# Patient Record
Sex: MALE | Race: WHITE
[De-identification: names, ages, dates, MRNs, and addresses within clinical notes are randomized per-mention and may not be internally consistent; named-entity substitution may affect disease eponyms.]

---

## 2023-08-22 ENCOUNTER — HOSPITAL ENCOUNTER (EMERGENCY)
Dept: HOSPITAL 97 - ER | Age: 63
Discharge: HOME | End: 2023-08-22
Payer: COMMERCIAL

## 2023-08-22 VITALS — TEMPERATURE: 97.2 F

## 2023-08-22 VITALS — DIASTOLIC BLOOD PRESSURE: 65 MMHG | SYSTOLIC BLOOD PRESSURE: 100 MMHG

## 2023-08-22 VITALS — OXYGEN SATURATION: 100 %

## 2023-08-22 DIAGNOSIS — Z79.82: ICD-10-CM

## 2023-08-22 DIAGNOSIS — K92.2: ICD-10-CM

## 2023-08-22 DIAGNOSIS — E11.9: ICD-10-CM

## 2023-08-22 DIAGNOSIS — K59.00: Primary | ICD-10-CM

## 2023-08-22 DIAGNOSIS — I10: ICD-10-CM

## 2023-08-22 DIAGNOSIS — Z95.818: ICD-10-CM

## 2023-08-22 DIAGNOSIS — Z79.4: ICD-10-CM

## 2023-08-22 DIAGNOSIS — K51.511: ICD-10-CM

## 2023-08-22 DIAGNOSIS — I25.2: ICD-10-CM

## 2023-08-22 LAB
ALBUMIN SERPL BCP-MCNC: 3.5 G/DL (ref 3.4–5)
ALP SERPL-CCNC: 62 U/L (ref 45–117)
ALT SERPL W P-5'-P-CCNC: 33 U/L (ref 16–61)
AST SERPL W P-5'-P-CCNC: 18 U/L (ref 15–37)
BUN BLD-MCNC: 18 MG/DL (ref 7–18)
GLUCOSE SERPLBLD-MCNC: 92 MG/DL (ref 74–106)
HCT VFR BLD CALC: 41 % (ref 39.6–49)
LIPASE SERPL-CCNC: 22 U/L (ref 13–75)
LYMPHOCYTES # SPEC AUTO: 1.2 K/UL (ref 0.7–4.9)
MCV RBC: 89.7 FL (ref 80–100)
PMV BLD: 7.6 FL (ref 7.6–11.3)
POTASSIUM SERPL-SCNC: 3.9 MEQ/L (ref 3.5–5.1)
RBC # BLD: 4.57 M/UL (ref 4.33–5.43)
WBC # BLD AUTO: 5.5 THOU/UL (ref 4.3–10.9)

## 2023-08-22 PROCEDURE — 96361 HYDRATE IV INFUSION ADD-ON: CPT

## 2023-08-22 PROCEDURE — 80053 COMPREHEN METABOLIC PANEL: CPT

## 2023-08-22 PROCEDURE — 83690 ASSAY OF LIPASE: CPT

## 2023-08-22 PROCEDURE — 81003 URINALYSIS AUTO W/O SCOPE: CPT

## 2023-08-22 PROCEDURE — 96367 TX/PROPH/DG ADDL SEQ IV INF: CPT

## 2023-08-22 PROCEDURE — 96375 TX/PRO/DX INJ NEW DRUG ADDON: CPT

## 2023-08-22 PROCEDURE — 36415 COLL VENOUS BLD VENIPUNCTURE: CPT

## 2023-08-22 PROCEDURE — 96365 THER/PROPH/DIAG IV INF INIT: CPT

## 2023-08-22 PROCEDURE — 85025 COMPLETE CBC W/AUTO DIFF WBC: CPT

## 2023-08-22 PROCEDURE — 99284 EMERGENCY DEPT VISIT MOD MDM: CPT

## 2023-08-22 PROCEDURE — 74177 CT ABD & PELVIS W/CONTRAST: CPT

## 2023-08-22 NOTE — RAD REPORT
EXAM DESCRIPTION:  CTAbdomen   Pelvis W Contrast - 8/22/2023 11:19 am

 

CLINICAL HISTORY:

Abd pain;Constipation

 

COMPARISON:  No comparisons

 

TECHNIQUE:  CT of the abdomen and pelvis was performed with IV contrast.

 

All CT scans are performed using dose optimization technique as appropriate and may include automated
 exposure control or mA/KV adjustment according to patient size.

 

FINDINGS:  Lower chest: Mild circumferential thickened distal esophagus . Small hiatal hernia. Corona
ry artery calcifications.

Liver: Question hepatic steatosis.

Biliary: No biliary ductal dilatation.

Stomach: No significant focal abnormality.

Duodenum: No significant focal abnormality.

Pancreas: No significant abnormality.

Spleen: No significant abnormality.

Adrenal: No suspicious lesions.

Kidney/ureter: No hydronephrosis. No renal calculi. Bilateral renal sinus and renal cortical cysts.

Retroperitoneum: No retroperitoneal adenopathy.

Vascular: No aneurysm.

Bowel: Sigmoid and rectal wall thickening, more pronounced at the rectum with hyperenhancement as wel
l. Normal appendix..

Peritoneum: No ascites or free air.

Bladder: Grossly unremarkable.

Reproductive: No adnexal masses.

Bones: No acute fracture. Moderate severe disc height loss at L5-S1.

Other: n/a

 

IMPRESSION:  Proctocolitis.

## 2023-08-22 NOTE — ER
Nurse's Notes                                                                                     

 [facility]                                                                 

Name: Raghu Alvarenga                                                                                 

Age: 62 yrs                                                                                       

Sex: Male                                                                                         

: 1960                                                                                   

MRN: [id number]                                                                                   

Arrival Date: 2023                                                                          

Time: 07:46                                                                                       

Account#: [id number]                                                                            

Bed 11                                                                                            

Private MD:                                                                                       

Diagnosis: Constipation;Constipation, unspecified;GI Bleed/ Gastrointestinal hemorrhage,          

  unspecified-lower;Left sided colitis with rectal bleeding-Proctocolitis                         

                                                                                                  

Presentation:                                                                                     

                                                                                             

07:50 Chief complaint: Patient states: trying to have a BM , can't pass it, X 2 hours,        iw  

      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       

      drinking enough water. Last BM was  , it was hard. Coronavirus screen: At this        

      time, the client does not indicate any symptoms associated with coronavirus-19. Ebola       

      Screen: Patient negative for fever greater than or equal to 101.5 degrees Fahrenheit,       

      and additional compatible Ebola Virus Disease symptoms Patient denies exposure to           

      infectious person. Patient denies travel to an Ebola-affected area in the 21 days           

      before illness onset. No symptoms or risks identified at this time. Initial Sepsis          

      Screen: Does the patient meet any 2 criteria? No. Patient's initial sepsis screen is        

      negative. Does the patient have a suspected source of infection? No. Patient's initial      

      sepsis screen is negative. Risk Assessment: Do you want to hurt yourself or someone         

      else? Patient reports no desire to harm self or others. Onset of symptoms was 2023.                                                                                   

07:50 Method Of Arrival: Ambulatory                                                           iw  

07:50 Acuity: EDELMIRA 3                                                                           iw  

                                                                                                  

Historical:                                                                                       

- Allergies:                                                                                      

07:53 No Known Allergies;                                                                     iw  

- Home Meds:                                                                                      

07:53 nystatin 500,000 unit Oral tablet 4 times per day [Active]; gabapentin 600 mg oral      iw  

      tablet 3 times per day [Active]; dexamethasone 2 mg Oral tablet [Active]; atorvastatin      

      80 mg oral tablet every evening [Active]; lisinopril-hydrochlorothiazide 10-12.5 mg         

      oral tablet daily [Active]; carvedilol 12.5 mg oral tablet 2 times per day [Active];        

      oxybutynin chloride 10 mg Oral Tablet, Extended Release 24 hr daily [Active]; Farxiga       

      10 mg oral tablet daily [Active]; Descovy 200-25 mg oral tablet daily [Active]; aspirin     

      81 mg Oral tablet,chewable daily [Active]; Ozempic 2 mg/dose (8 mg/3 mL) subcutaneous       

      Pen Injector every week [Active];                                                           

- PMHx:                                                                                           

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  

- PSHx:                                                                                           

07:57 cardiac stent;                                                                          iw  

                                                                                                  

- Immunization history:: Adult Immunizations up to date.                                          

- Social history:: Smoking status: Patient denies any tobacco usage or history of.                

                                                                                                  

                                                                                                  

Screenin:49 [facility] ED Fall Risk Assessment (Adult) History of falling in the last 3 months,       tf2 

      including since admission No falls in past 3 months (0 pts) Confusion or Disorientation     

      No (0 pts) Intoxicated or Sedated No (0 pts) Impaired Gait No (0 pts) Mobility Assist       

      Device Used No (0 pt) Altered Elimination No (0 pt) Score/Fall Risk Level 0 - 2 = Low       

      Risk Oriented to surroundings, Maintained a safe environment, Educated pt \T\ family on     

      fall prevention, incl call for assistance when getting out of bed, Assessed \T\             

      reinforced patient's understanding of fall precautions, Provided non-skid footwear.         

08:50 Abuse screen: Denies threats or abuse. Denies injuries from another. Nutritional        tf2 

      screening: No deficits noted. Tuberculosis screening: No symptoms or risk factors           

      identified.                                                                                 

                                                                                                  

Assessment:                                                                                       

08:41 General: Appears in no apparent distress. Behavior is calm, cooperative, Denies fever,  tf2 

      fatigue, chills. Pain: Complains of pain in gluteal cleft. Neuro: No deficits noted.        

      Cardiovascular: No deficits noted. Respiratory: No deficits noted. GI: Bowel sounds         

      present X 4 quads. : No deficits noted. EENT: Reports difficulty swallowing since         

      radiation treatment completed. pain when swallowing. Derm: Skin is red.                     

      Musculoskeletal: No deficits noted.                                                         

14:09 GI: Abd is non tender.                                                                  tf2 

                                                                                                  

Vital Signs:                                                                                      

07:50  / 72; Pulse 56; Resp 16; Temp 97.2; Pulse Ox 100% on R/A; Weight 119.75 kg;      iw  

      Height 6 ft. 4 in. ; Pain 3/10;                                                             

08:43 BP 98 / 61; Pulse 44; Resp 16; Pulse Ox 99% on R/A;                                     tf2 

11:29  / 71; Pulse 44; Resp 18; Pulse Ox 100% on R/A;                                   tf2 

13:30  / 65; Pulse 45; Resp 18; Pulse Ox 100% on R/A;                                   tf2 

07:50 Body Mass Index 32.13 (119.75 kg, 193.04 cm)                                            iw  

07:50 Pain Scale: Adult                                                                       iw  

                                                                                                  

Vitals:                                                                                           

08:43 Cardiac Rhythm Assessment Regular Sinus joanna.                                          tf2 

                                                                                                  

Miko Coma Score:                                                                               

08:43 Eye Response: spontaneous(4). Motor Response: obeys commands(6). Verbal Response:       tf2 

      oriented(5). Total: 15.                                                                     

09:38 Eye Response: spontaneous(4). Motor Response: obeys commands(6). Verbal Response:       rubi 

      oriented(5). Total: 15.                                                                     

                                                                                                  

ED Course:                                                                                        

07:48 Patient arrived in ED.                                                                  mr  

07:53 Triage completed.                                                                       iw  

07:54 Yan Gilliland MD is Attending Physician.                                             rubi 

07:57 Arm band placed on.                                                                     iw  

08:10 Inserted saline lock: 20 gauge in right antecubital area, using aseptic technique.      rs5 

      Blood collected.                                                                            

08:20 Nhung Llanes, RN is Primary Nurse.                                                        tf2 

08:50 Awaiting lab results.                                                                   tf2 

08:50 Patient has correct armband on for positive identification. Bed in low position. Call   tf2 

      light in reach. Side rails up X 1. Provided Education on: medications being                 

      administered..                                                                              

08:50 No provider procedures requiring assistance completed.                                  tf2 

10:00 Pt up to bathroom to collect urine; only able to void approx 10 cc. Pt given 2nd liter  tf2 

      of NS and additional lacutlose med as ordered.                                              

11:21 CT Abd/Pelvis - PO and IV Contrast In Process Unspecified.                              EDMS

11:40 Joselyn Amato MD is Referral Physician.                                                  rubi 

13:30 IV discontinued.                                                                        tf2 

                                                                                                  

Administered Medications:                                                                         

08:39 Drug: Famotidine IVP 20 mg Route: IVP; Site: right antecubital;                         tf2 

08:39 Drug: Dulcolax VA Suppository 10 mg Route: VA;                                          tf2 

13:45 Follow up: Response: No adverse reaction                                                tf2 

08:39 Drug: Lactulose PO 30 grams Volume: 45 ml; Route: PO;                                   tf2 

13:45 Follow up: Response: No adverse reaction                                                tf2 

08:40 Drug: NS 0.9% IV 1000 ml Route: IV; Rate: 1 bolus; Infused Over: 30 mins; Site: right   tf2 

      antecubital;                                                                                

10:00 Follow up: IV Status: Completed infusion                                                tf2 

08:40 Drug: Ondansetron IVP 4 mg Route: IVP; Site: right antecubital;                         tf2 

08:40 Drug: morphine IVP or IV 4 mg Route: IVP; Infused Over: 4 mins; Site: right antecubital;tf2 

09:54 Drug: NS 0.9% IV 1000 ml Route: IV; Rate: 1 bolus; Site: right antecubital;             tf2 

11:45 Follow up: IV Status: Completed infusion                                                tf2 

10:01 Drug: Lactulose PO 30 grams Volume: 45 ml; Route: PO;                                   tf2 

13:46 Follow up: Response: No adverse reaction; Marked relief of symptoms                     tf2 

12:05 Drug: Rocephin IV 1 grams Route: IV; Rate: per protocol; Site: right antecubital;       tf2 

13:43 Follow up: Response: No adverse reaction                                                tf2 

12:10 Drug: metroNIDAZOLE IVPB 500 mg Volume: 100 ml; Route: IVPB; Rate: 200 ml/hr; Infused   tf2 

      Over: 30 mins; Site: right antecubital;                                                     

12:40 Follow up: Response: No adverse reaction; IV Status: Completed infusion; IV Intake:     tf2 

      100ml                                                                                       

12:45 Drug: Ciprofloxacin IVPB 400 mg Volume: 200 ml; Route: IVPB; Infused Over: 60 mins;     tf2 

      Site: right antecubital;                                                                    

13:45 Follow up: Response: No adverse reaction; IV Status: Completed infusion                 tf2 

                                                                                                  

                                                                                                  

Medication:                                                                                       

13:30 VIS not applicable for this client.                                                     tf2 

                                                                                                  

Intake:                                                                                           

12:40 IV: 100ml; Total: 100ml.                                                                tf2 

                                                                                                  

Outcome:                                                                                          

11:41 Discharge ordered by MD. venegas 

13:30 Condition: good                                                                         tf2 

13:30 Discharge instructions given to patient, Instructed on discharge instructions, follow       

      up and referral plans. medication usage, Diet and fluid intake Demonstrated                 

      understanding of instructions, follow-up care, medications, Prescriptions given X 4.        

14:09 Discharged to home                                                                      tf2 

14:10 Patient left the ED.                                                                    tf2 

                                                                                                  

Signatures:                                                                                       

Dispatcher [employer]                           EDMS                                                 

Yan Gilliland MD MD cha Rivera, Rhea                                                                                    

Leny Elmore RN RN   iw                                                   

Igor Coleman RN RN   rs5                                                  

Nhung Llanes RN                          RN   tf2                                                  

                                                                                                  

Corrections: (The following items were deleted from the chart)                                    

08:11 07:50 Chief complaint: Patient states: trying to have a BM , can't pass it, X 2 hours,  iw  

      recovering from cancer n his tonsil and right lymph node, feels like he has not been        

      drinking enough water. Last BM was  , it was hard iw                                  

                                                                                                  

**************************************************************************************************

## 2023-08-22 NOTE — EDPHYS
Physician Documentation                                                                           

 [facility]                                                                 

Name: Raghu Alvarenga                                                                                 

Age: 62 yrs                                                                                       

Sex: Male                                                                                         

: 1960                                                                                   

MRN: [id number]                                                                                   

Arrival Date: 2023                                                                          

Time: 07:46                                                                                       

Account#: [id number]                                                                            

Bed 11                                                                                            

Private MD:                                                                                       

[facility] Physician Yan Gilliland                                                                      

HPI:                                                                                              

                                                                                             

09:20 This 62 yrs old  Male presents to ER via Ambulatory with complaints of         rubi 

      Constipation, Rectal Bleeding.                                                              

09:20 The patient presents to the emergency department with bleeding from the rectum/anus,    rubi 

      that is mild. Onset: The symptoms/episode began/occurred 3 day(s) ago. Context: the         

      patient has no known special context relating to the rectal area complaint(s).              

      Modifying factors: The symptoms are alleviated by nothing, The symptoms are aggravated      

      by bowel movement.                                                                          

                                                                                                  

Historical:                                                                                       

- Allergies:                                                                                      

07:53 No Known Allergies;                                                                     iw  

- Home Meds:                                                                                      

07:53 nystatin 500,000 unit Oral tablet 4 times per day [Active]; gabapentin 600 mg oral      iw  

      tablet 3 times per day [Active]; dexamethasone 2 mg Oral tablet [Active]; atorvastatin      

      80 mg oral tablet every evening [Active]; lisinopril-hydrochlorothiazide 10-12.5 mg         

      oral tablet daily [Active]; carvedilol 12.5 mg oral tablet 2 times per day [Active];        

      oxybutynin chloride 10 mg Oral Tablet, Extended Release 24 hr daily [Active]; Farxiga       

      10 mg oral tablet daily [Active]; Descovy 200-25 mg oral tablet daily [Active]; aspirin     

      81 mg Oral tablet,chewable daily [Active]; Ozempic 2 mg/dose (8 mg/3 mL) subcutaneous       

      Pen Injector every week [Active];                                                           

- PMHx:                                                                                           

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  

- PSHx:                                                                                           

07:57 cardiac stent;                                                                          iw  

                                                                                                  

- Immunization history:: Adult Immunizations up to date.                                          

- Social history:: Smoking status: Patient denies any tobacco usage or history of.                

                                                                                                  

                                                                                                  

ROS:                                                                                              

09:38 Constitutional: Negative for fever, chills, and weight loss, Eyes: Negative for injury, rubi 

      pain, redness, and discharge, ENT: Negative for injury, pain, and discharge, Neck:          

      Negative for injury, pain, and swelling, Cardiovascular: Negative for chest pain,           

      palpitations, and edema, Respiratory: Negative for shortness of breath, cough,              

      wheezing, and pleuritic chest pain, Back: Negative for injury and pain, : Negative        

      for injury, bleeding, discharge, and swelling, MS/Extremity: Negative for injury and        

      deformity, Skin: Negative for injury, rash, and discoloration, Neuro: Negative for          

      headache, weakness, numbness, tingling, and seizure, Psych: Negative for depression,        

      anxiety, suicide ideation, homicidal ideation, and hallucinations, Allergy/Immunology:      

      Negative for hives, rash, and allergies, Endocrine: Negative for neck swelling,             

      polydipsia, polyuria, polyphagia, and marked weight changes, Hematologic/Lymphatic:         

      Negative for swollen nodes, abnormal bleeding, and unusual bruising.                        

09:38 Respiratory: Positive for                                                                   

09:38 Abdomen/GI: Positive for                                                                    

                                                                                                  

Exam:                                                                                             

09:38 Constitutional:  This is a well developed, well nourished patient who is awake, alert,  rubi 

      and in no acute distress. Head/Face:  Normocephalic, atraumatic. Eyes:  Pupils equal        

      round and reactive to light, extra-ocular motions intact.  Lids and lashes normal.          

      Conjunctiva and sclera are non-icteric and not injected.  Cornea within normal limits.      

      Periorbital areas with no swelling, redness, or edema. ENT:  Nares patent. No nasal         

      discharge, no septal abnormalities noted.  Tympanic membranes are normal and external       

      auditory canals are clear.  Oropharynx with no redness, swelling, or masses, exudates,      

      or evidence of obstruction, uvula midline.  Mucous membranes moist. Neck:  Trachea          

      midline, no thyromegaly or masses palpated, and no cervical lymphadenopathy.  Supple,       

      full range of motion without nuchal rigidity, or vertebral point tenderness.  No            

      Meningismus. Chest/axilla:  Normal chest wall appearance and motion.  Nontender with no     

      deformity.  No lesions are appreciated. Cardiovascular:  Regular rate and rhythm with a     

      normal S1 and S2.  No gallops, murmurs, or rubs.  Normal PMI, no JVD.  No pulse             

      deficits. Respiratory:  Lungs have equal breath sounds bilaterally, clear to                

      auscultation and percussion.  No rales, rhonchi or wheezes noted.  No increased work of     

      breathing, no retractions or nasal flaring. Back:  No spinal tenderness.  No                

      costovertebral tenderness.  Full range of motion. Male :  Normal genitalia with no        

      discharge or lesions. Skin:  Warm, dry with normal turgor.  Normal color with no            

      rashes, no lesions, and no evidence of cellulitis. MS/ Extremity:  Pulses equal, no         

      cyanosis.  Neurovascular intact.  Full, normal range of motion. Neuro:  Awake and           

      alert, GCS 15, oriented to person, place, time, and situation.  Cranial nerves II-XII       

      grossly intact.  Motor strength 5/5 in all extremities.  Sensory grossly intact.            

      Cerebellar exam normal.  Normal gait. Psych:  Awake, alert, with orientation to person,     

      place and time.  Behavior, mood, and affect are within normal limits.                       

09:38 Abdomen/GI: Inspection: abdomen appears normal, Bowel sounds: normal, Palpation: mild       

      abdominal tenderness, in the left upper quadrant and left lower quadrant, Liver: no         

      appreciated palpable abnormalities, Hernia: not appreciated.                                

                                                                                                  

Vital Signs:                                                                                      

07:50  / 72; Pulse 56; Resp 16; Temp 97.2; Pulse Ox 100% on R/A; Weight 119.75 kg;      iw  

      Height 6 ft. 4 in. ; Pain 3/10;                                                             

08:43 BP 98 / 61; Pulse 44; Resp 16; Pulse Ox 99% on R/A;                                     tf2 

11:29  / 71; Pulse 44; Resp 18; Pulse Ox 100% on R/A;                                   tf2 

13:30  / 65; Pulse 45; Resp 18; Pulse Ox 100% on R/A;                                   tf2 

07:50 Body Mass Index 32.13 (119.75 kg, 193.04 cm)                                            iw  

07:50 Pain Scale: Adult                                                                       iw  

                                                                                                  

Excelsior Coma Score:                                                                               

08:43 Eye Response: spontaneous(4). Motor Response: obeys commands(6). Verbal Response:       tf2 

      oriented(5). Total: 15.                                                                     

09:38 Eye Response: spontaneous(4). Motor Response: obeys commands(6). Verbal Response:       rubi 

      oriented(5). Total: 15.                                                                     

                                                                                                  

MDM:                                                                                              

07:54 Patient medically screened.                                                             rubi 

10:39 Differential diagnosis: hemorrhoids. Data reviewed: vital signs, nurses notes, lab test rubi 

      result(s), radiologic studies. Consideration of Admission/Observation Escalation of         

      care including admission/observation considered. I considered the following discharge       

      prescriptions or medication management in the emergency department Medications were         

      administered in the Emergency Department. See MAR. Test considered but Not performed:       

      MRI: mri abd not done. Care significantly affected by the following chronic conditions:     

      Diabetes, Hypertension, mi, oral cancer.                                                    

                                                                                                  

                                                                                             

07:55 Order name: CBC with Diff; Complete Time: 11:07                                         [facility] 

                                                                                             

07:55 Order name: CMP; Complete Time: 11:38                                                   [facility] 

                                                                                             

07:55 Order name: Lipase; Complete Time: 11:38                                                [facility] 

                                                                                             

07:55 Order name: Urinalysis w/ reflexes; Complete Time: 11:38                                [facility] 

                                                                                             

08:50 Order name: CT Abd/Pelvis - PO and IV Contrast; Complete Time: 11:38                    [facility] 

                                                                                             

07:55 Order name: IV Saline Lock; Complete Time: 08:20                                        [facility] 

                                                                                             

07:55 Order name: Labs collected and sent; Complete Time: 08:21                               [facility] 

                                                                                                  

Administered Medications:                                                                         

08:39 Drug: Famotidine IVP 20 mg Route: IVP; Site: right antecubital;                         tf2 

08:39 Drug: Dulcolax AR Suppository 10 mg Route: AR;                                          tf2 

13:45 Follow up: Response: No adverse reaction                                                tf2 

08:39 Drug: Lactulose PO 30 grams Volume: 45 ml; Route: PO;                                   tf2 

13:45 Follow up: Response: No adverse reaction                                                tf2 

08:40 Drug: NS 0.9% IV 1000 ml Route: IV; Rate: 1 bolus; Infused Over: 30 mins; Site: right   tf2 

      antecubital;                                                                                

10:00 Follow up: IV Status: Completed infusion                                                tf2 

08:40 Drug: Ondansetron IVP 4 mg Route: IVP; Site: right antecubital;                         tf2 

08:40 Drug: morphine IVP or IV 4 mg Route: IVP; Infused Over: 4 mins; Site: right antecubital;tf2 

09:54 Drug: NS 0.9% IV 1000 ml Route: IV; Rate: 1 bolus; Site: right antecubital;             tf2 

11:45 Follow up: IV Status: Completed infusion                                                tf2 

10:01 Drug: Lactulose PO 30 grams Volume: 45 ml; Route: PO;                                   tf2 

13:46 Follow up: Response: No adverse reaction; Marked relief of symptoms                     tf2 

12:05 Drug: Rocephin IV 1 grams Route: IV; Rate: per protocol; Site: right antecubital;       tf2 

13:43 Follow up: Response: No adverse reaction                                                tf2 

12:10 Drug: metroNIDAZOLE IVPB 500 mg Volume: 100 ml; Route: IVPB; Rate: 200 ml/hr; Infused   tf2 

      Over: 30 mins; Site: right antecubital;                                                     

12:40 Follow up: Response: No adverse reaction; IV Status: Completed infusion; IV Intake:     tf2 

      100ml                                                                                       

12:45 Drug: Ciprofloxacin IVPB 400 mg Volume: 200 ml; Route: IVPB; Infused Over: 60 mins;     tf2 

      Site: right antecubital;                                                                    

13:45 Follow up: Response: No adverse reaction; IV Status: Completed infusion                 tf2 

                                                                                                  

                                                                                                  

Disposition Summary:                                                                              

23 11:41                                                                                    

Discharge Ordered                                                                                 

      Location: Home                                                                          [facility] 

      Problem: new                                                                            rubi 

      Symptoms: have improved                                                                 rubi 

      Condition: Stable                                                                       rubi 

      Diagnosis                                                                                   

        - Constipation                                                                        rubi 

        - Constipation, unspecified                                                           rubi 

        - GI Bleed/ Gastrointestinal hemorrhage, unspecified - lower                          rubi 

        - Left sided colitis with rectal bleeding - Proctocolitis                             [facility] 

      Followup:                                                                               rubi 

        - With: Private Physician                                                                  

        - When: 2 - 3 days                                                                         

        - Reason: Recheck today's complaints, Continuance of care, Re-evaluation by your           

      physician                                                                                   

      Followup:                                                                               rubi 

        - With:                                                                                    

        - When: 2 - 3 days                                                                         

        - Reason: Recheck today's complaints, Re-evaluation by your physician                      

      Discharge Instructions:                                                                     

        - Discharge Summary Sheet                                                             rubi 

        - Constipation, Adult                                                                 rubi 

        - Gastrointestinal Bleeding                                                           rubi 

        - Rectal Bleeding                                                                     rubi 

        - Constipation, Adult, Easy-to-Read                                                   rubi 

        - Rectal Bleeding, Easy-to-Read                                                       rubi 

        - Colitis                                                                             [facility] 

      Forms:                                                                                      

        - Medication Reconciliation Form                                                      [facility] 

        - Thank You Letter                                                                    [facility] 

        - Antibiotic Education                                                                [facility] 

        - Prescription Opioid Use                                                             [facility] 

        - Patient Portal Instructions                                                         [facility] 

        - Leadership Thank You Letter                                                         [facility] 

      Prescriptions:                                                                              

        - Dulcolax (bisacodyl) 10 mg Rectal suppository                                            

            - insert 1 suppository by RECTAL route every 12 hours for 5 days; 10 suppository; [facility] 

      Refills: 0, Product Selection Permitted                                                     

        - Flagyl 500 mg Oral Tablet                                                                

            - take 1 tablet by ORAL route every 6 hours for 10 days; 28 tablet; Refills: 0,   [facility] 

      Product Selection Permitted                                                                 

        - Lactulose 10 gram/15 mL Oral Solution                                                    

            - take 30 milliliters by ORAL route once daily; 300 milliliter; Refills: 0,       [facility] 

      Product Selection Permitted                                                                 

        - Cipro 500 mg Oral Tablet                                                                 

            - take 1 tablet by ORAL route every 12 hours for 7 days; 14 tablet; Refills: 0,   [facility] 

      Product Selection Permitted                                                                 

Signatures:                                                                                       

Dispatcher [employer]                           EDMS                                                 

Talat, Yan, MD                     MD   rubi                                                  

Ramírez, Leny, RN                     RN   iw                                                   

Llanes, Nhung, RN                          RN   tf2                                                  

                                                                                                  

**************************************************************************************************